# Patient Record
Sex: FEMALE | Race: ASIAN | NOT HISPANIC OR LATINO | Employment: FULL TIME | URBAN - METROPOLITAN AREA
[De-identification: names, ages, dates, MRNs, and addresses within clinical notes are randomized per-mention and may not be internally consistent; named-entity substitution may affect disease eponyms.]

---

## 2017-01-06 ENCOUNTER — OFFICE VISIT - HEALTHEAST (OUTPATIENT)
Dept: FAMILY MEDICINE | Facility: CLINIC | Age: 32
End: 2017-01-06

## 2017-01-06 DIAGNOSIS — Z00.00 ROUTINE GENERAL MEDICAL EXAMINATION AT A HEALTH CARE FACILITY: ICD-10-CM

## 2017-01-06 DIAGNOSIS — M54.50 LOWER BACK PAIN: ICD-10-CM

## 2017-01-06 ASSESSMENT — MIFFLIN-ST. JEOR: SCORE: 1430.05

## 2017-01-10 ENCOUNTER — AMBULATORY - HEALTHEAST (OUTPATIENT)
Dept: LAB | Facility: CLINIC | Age: 32
End: 2017-01-10

## 2017-01-10 DIAGNOSIS — Z00.00 ROUTINE GENERAL MEDICAL EXAMINATION AT A HEALTH CARE FACILITY: ICD-10-CM

## 2017-01-10 LAB
CHOLEST SERPL-MCNC: 125 MG/DL
FASTING STATUS PATIENT QL REPORTED: YES
HDLC SERPL-MCNC: 39 MG/DL
LDLC SERPL CALC-MCNC: 76 MG/DL
TRIGL SERPL-MCNC: 51 MG/DL

## 2017-01-12 LAB
BKR LAB AP ABNORMAL BLEEDING: NO
BKR LAB AP BIRTH CONTROL/HORMONES: NORMAL
BKR LAB AP CERVICAL APPEARANCE: NORMAL
BKR LAB AP GYN ADEQUACY: NORMAL
BKR LAB AP GYN INTERPRETATION: NORMAL
BKR LAB AP HPV REFLEX: NORMAL
BKR LAB AP LMP: NORMAL
BKR LAB AP PATIENT STATUS: NORMAL
BKR LAB AP PREVIOUS ABNORMAL: NO
BKR LAB AP PREVIOUS NORMAL: NO
HIGH RISK?: NO
PATH REPORT.COMMENTS IMP SPEC: NORMAL
RESULT FLAG (HE HISTORICAL CONVERSION): NORMAL

## 2017-11-03 ENCOUNTER — OFFICE VISIT - HEALTHEAST (OUTPATIENT)
Dept: FAMILY MEDICINE | Facility: CLINIC | Age: 32
End: 2017-11-03

## 2017-11-03 DIAGNOSIS — Z23 INFLUENZA VACCINE NEEDED: ICD-10-CM

## 2017-11-03 DIAGNOSIS — M54.50 CHRONIC MIDLINE LOW BACK PAIN WITHOUT SCIATICA: ICD-10-CM

## 2017-11-03 DIAGNOSIS — M54.2 BILATERAL POSTERIOR NECK PAIN: ICD-10-CM

## 2017-11-03 DIAGNOSIS — Z98.891 H/O: CESAREAN SECTION: ICD-10-CM

## 2017-11-03 DIAGNOSIS — G89.29 CHRONIC MIDLINE LOW BACK PAIN WITHOUT SCIATICA: ICD-10-CM

## 2017-11-03 DIAGNOSIS — R22.30: ICD-10-CM

## 2017-11-03 ASSESSMENT — MIFFLIN-ST. JEOR: SCORE: 1412.06

## 2017-12-08 ENCOUNTER — RECORDS - HEALTHEAST (OUTPATIENT)
Dept: ADMINISTRATIVE | Facility: OTHER | Age: 32
End: 2017-12-08

## 2018-03-14 ENCOUNTER — RECORDS - HEALTHEAST (OUTPATIENT)
Dept: ADMINISTRATIVE | Facility: OTHER | Age: 33
End: 2018-03-14

## 2019-02-19 ENCOUNTER — OFFICE VISIT - HEALTHEAST (OUTPATIENT)
Dept: FAMILY MEDICINE | Facility: CLINIC | Age: 34
End: 2019-02-19

## 2019-02-19 DIAGNOSIS — L29.3 ITCHY SKIN OF ANUS AND GENITALS: ICD-10-CM

## 2019-05-13 ENCOUNTER — OFFICE VISIT - HEALTHEAST (OUTPATIENT)
Dept: FAMILY MEDICINE | Facility: CLINIC | Age: 34
End: 2019-05-13

## 2019-05-13 DIAGNOSIS — E28.2 PCOS (POLYCYSTIC OVARIAN SYNDROME): ICD-10-CM

## 2019-05-13 DIAGNOSIS — L68.9 EXCESSIVE HAIR GROWTH: ICD-10-CM

## 2019-05-13 DIAGNOSIS — L29.9 ITCHY SKIN: ICD-10-CM

## 2019-05-13 DIAGNOSIS — M72.2 PLANTAR FASCIITIS: ICD-10-CM

## 2019-05-13 LAB
ALBUMIN SERPL-MCNC: 3.8 G/DL (ref 3.5–5)
ALP SERPL-CCNC: 78 U/L (ref 45–120)
ALT SERPL W P-5'-P-CCNC: 13 U/L (ref 0–45)
ANION GAP SERPL CALCULATED.3IONS-SCNC: 11 MMOL/L (ref 5–18)
AST SERPL W P-5'-P-CCNC: 11 U/L (ref 0–40)
BILIRUB SERPL-MCNC: 0.3 MG/DL (ref 0–1)
BUN SERPL-MCNC: 9 MG/DL (ref 8–22)
CALCIUM SERPL-MCNC: 9.7 MG/DL (ref 8.5–10.5)
CHLORIDE BLD-SCNC: 104 MMOL/L (ref 98–107)
CO2 SERPL-SCNC: 22 MMOL/L (ref 22–31)
CREAT SERPL-MCNC: 0.71 MG/DL (ref 0.6–1.1)
ERYTHROCYTE [DISTWIDTH] IN BLOOD BY AUTOMATED COUNT: 11.3 % (ref 11–14.5)
GFR SERPL CREATININE-BSD FRML MDRD: >60 ML/MIN/1.73M2
GLUCOSE BLD-MCNC: 80 MG/DL (ref 70–125)
HBA1C MFR BLD: 5.7 % (ref 3.5–6.1)
HCT VFR BLD AUTO: 37.8 % (ref 35–47)
HGB BLD-MCNC: 12.4 G/DL (ref 12–16)
MCH RBC QN AUTO: 28 PG (ref 27–34)
MCHC RBC AUTO-ENTMCNC: 32.7 G/DL (ref 32–36)
MCV RBC AUTO: 86 FL (ref 80–100)
PLATELET # BLD AUTO: 297 THOU/UL (ref 140–440)
PMV BLD AUTO: 7 FL (ref 7–10)
POTASSIUM BLD-SCNC: 4.2 MMOL/L (ref 3.5–5)
PROT SERPL-MCNC: 7.6 G/DL (ref 6–8)
RBC # BLD AUTO: 4.41 MILL/UL (ref 3.8–5.4)
SODIUM SERPL-SCNC: 137 MMOL/L (ref 136–145)
T4 FREE SERPL-MCNC: 0.9 NG/DL (ref 0.7–1.8)
TSH SERPL DL<=0.005 MIU/L-ACNC: 1.09 UIU/ML (ref 0.3–5)
WBC: 9.5 THOU/UL (ref 4–11)

## 2019-05-13 RX ORDER — FEXOFENADINE HCL 180 MG/1
180 TABLET ORAL DAILY
Status: SHIPPED | COMMUNITY
Start: 2019-05-13 | End: 2022-03-25

## 2019-05-14 LAB
25(OH)D3 SERPL-MCNC: 18.9 NG/ML (ref 30–80)
25(OH)D3 SERPL-MCNC: 18.9 NG/ML (ref 30–80)

## 2019-08-13 ENCOUNTER — APPOINTMENT (OUTPATIENT)
Age: 34
Setting detail: DERMATOLOGY
End: 2019-08-14

## 2019-08-13 VITALS — WEIGHT: 184 LBS | HEIGHT: 60 IN | RESPIRATION RATE: 14 BRPM

## 2019-08-13 DIAGNOSIS — L259 CONTACT DERMATITIS AND OTHER ECZEMA, UNSPECIFIED CAUSE: ICD-10-CM

## 2019-08-13 PROBLEM — L23.9 ALLERGIC CONTACT DERMATITIS, UNSPECIFIED CAUSE: Status: ACTIVE | Noted: 2019-08-13

## 2019-08-13 PROCEDURE — OTHER COUNSELING: OTHER

## 2019-08-13 PROCEDURE — OTHER PRESCRIPTION: OTHER

## 2019-08-13 PROCEDURE — 99202 OFFICE O/P NEW SF 15 MIN: CPT

## 2019-08-13 RX ORDER — TRIAMCINOLONE ACETONIDE 1 MG/G
0.1% CREAM TOPICAL TWICE DAILY
Qty: 1 | Refills: 0 | Status: ERX | COMMUNITY
Start: 2019-08-13

## 2019-08-13 ASSESSMENT — LOCATION DETAILED DESCRIPTION DERM
LOCATION DETAILED: RIGHT MEDIAL FRONTAL SCALP
LOCATION DETAILED: MONS PUBIS
LOCATION DETAILED: RIGHT RADIAL DORSAL HAND
LOCATION DETAILED: LEFT ULNAR DORSAL HAND
LOCATION DETAILED: LEFT LATERAL DORSAL FOOT
LOCATION DETAILED: RIGHT LATERAL DORSAL FOOT

## 2019-08-13 ASSESSMENT — LOCATION ZONE DERM
LOCATION ZONE: HAND
LOCATION ZONE: VULVA
LOCATION ZONE: SCALP
LOCATION ZONE: FEET

## 2019-08-13 ASSESSMENT — LOCATION SIMPLE DESCRIPTION DERM
LOCATION SIMPLE: RIGHT FOOT
LOCATION SIMPLE: RIGHT HAND
LOCATION SIMPLE: RIGHT SCALP
LOCATION SIMPLE: LEFT HAND
LOCATION SIMPLE: LEFT FOOT
LOCATION SIMPLE: GROIN

## 2019-08-13 NOTE — PROCEDURE: COUNSELING
Patient Specific Counseling (Will Not Stick From Patient To Patient): Informed her that it sounds like it is coming from a product that she is coming into contact with on her skin. Recommended that she only use products that are free from fragrances including her laundry soap. She states that hydrocortisone OTC has been helpful. Recommended that we treat with a prescribed topical steroid. Recommended Dove and Aveeno OTC for washing and moisturizing. Recommended that she alternate Selsun Blue with OTC Head and Shoulders for her scalp.\\nRecommended that she continue with the Allegra. She states that she notes pain in her feet or ankle pain with the Allegra. Informed her that it is likely not associated with her condition and likely coincidentally.\\nRecommended that she continue with the hydrocortisone OTC for the groin area.

## 2019-12-23 ENCOUNTER — OFFICE VISIT - HEALTHEAST (OUTPATIENT)
Dept: FAMILY MEDICINE | Facility: CLINIC | Age: 34
End: 2019-12-23

## 2019-12-23 DIAGNOSIS — Z00.00 ROUTINE GENERAL MEDICAL EXAMINATION AT A HEALTH CARE FACILITY: ICD-10-CM

## 2019-12-23 DIAGNOSIS — T78.40XS ALLERGIC STATE, SEQUELA: ICD-10-CM

## 2019-12-23 DIAGNOSIS — E55.9 VITAMIN D INSUFFICIENCY: ICD-10-CM

## 2019-12-23 LAB
CHOLEST SERPL-MCNC: 137 MG/DL
FASTING STATUS PATIENT QL REPORTED: YES
FASTING STATUS PATIENT QL REPORTED: YES
GLUCOSE BLD-MCNC: 91 MG/DL (ref 70–99)
HDLC SERPL-MCNC: 42 MG/DL
LDLC SERPL CALC-MCNC: 80 MG/DL
TRIGL SERPL-MCNC: 74 MG/DL

## 2019-12-23 ASSESSMENT — MIFFLIN-ST. JEOR: SCORE: 1420.29

## 2020-01-03 ENCOUNTER — OFFICE VISIT - HEALTHEAST (OUTPATIENT)
Dept: ALLERGY | Facility: CLINIC | Age: 35
End: 2020-01-03

## 2020-01-03 DIAGNOSIS — E55.9 VITAMIN D DEFICIENCY: ICD-10-CM

## 2020-01-03 DIAGNOSIS — L50.9 HIVES: ICD-10-CM

## 2020-01-03 LAB — EOSINOPHIL COUNT (ABS) - HISTORICAL: 122 /UL (ref 50–400)

## 2020-01-03 ASSESSMENT — MIFFLIN-ST. JEOR: SCORE: 1420.29

## 2020-01-05 LAB — 25(OH)D3 SERPL-MCNC: 17.5 NG/ML (ref 30–80)

## 2020-01-06 ENCOUNTER — AMBULATORY - HEALTHEAST (OUTPATIENT)
Dept: ALLERGY | Facility: CLINIC | Age: 35
End: 2020-01-06

## 2020-01-06 ENCOUNTER — COMMUNICATION - HEALTHEAST (OUTPATIENT)
Dept: ALLERGY | Facility: CLINIC | Age: 35
End: 2020-01-06

## 2020-01-06 DIAGNOSIS — E56.9 VITAMIN DEFICIENCY: ICD-10-CM

## 2020-10-12 ENCOUNTER — COMMUNICATION - HEALTHEAST (OUTPATIENT)
Dept: ALLERGY | Facility: CLINIC | Age: 35
End: 2020-10-12

## 2020-10-12 DIAGNOSIS — E56.9 VITAMIN DEFICIENCY: ICD-10-CM

## 2021-05-28 NOTE — PROGRESS NOTES
ASSESSMENT/PLAN:  Itchy skin  I advised that she continues with Allegra which she has found to be helpful.  She has switched to Aveeno and Dove for skin hydration and cleanser.  I advise using personal care products with safe for ingredients that is also hypoallergenic; consider beautycounter products.  She will follow-up with me as needed  -     HM2(CBC w/o Differential)  -     Comprehensive Metabolic Panel    Plantar fasciitis, bilateral  Patient instructions printed.  Stretching exercises provided.  I advised supportive shoes.  Follow-up as needed    Excessive facial hair growth  Hair loss of her head  Consider PCOS.  Will start metformin  Follow-up in 1 month  -     Thyroid Stimulating Hormone (TSH)  -     T4, Free  -     Glycosylated Hemoglobin A1c  -     Vitamin D, Total (25-Hydroxy)  -     metFORMIN (GLUCOPHAGE XR) 500 MG 24 hr tablet; Take 4 tablets (2,000 mg total) by mouth daily with breakfast.    Patient Instructions   Take metformin 1 tablet daily for one week, then 2 tabs daily for the second week, 3 tabs daily for the 3rd week, and 4 tab daily thereafter    Follow up in one month      SUBJECTIVE:    Katerina Moran is a 33 y.o. female who came in today     Itchy skin  The patient comes in today for follow-up.  She complains of itchiness especially of the interdigit of both hands and feet.  The itchiness is worse at night.  She has tried Allegra which does help.  She also complains occasionally itchiness of the anus and genital area as well.  She has switched to Aveeno lotion and Dove soap.  There is no one at home who complains of itchiness.    Plantar fasciitis, bilateral  The patient complains of bilateral heel pain that is worse in the morning.  It is worse upon the first few steps.  It does get somewhat better as the day progresses.  It is also better when she does not bear weight.  This is been occurring for about a month    Excessive facial hair growth  The patient has had excessive hair growth  and moves noticeable for about a month now.    Hair loss  The patient has had progressive hair loss over the last 2 to 3 years now worse over the last month.  He was seen by her gynecologist many years ago and told of PCOS diagnosis.  She was never started on medication.  She is not on birth control pills.    Review of Systems (except those mentioned above)  Constitutional: Negative.   HENT: Negative.   Eyes: Negative.   Respiratory: Negative.   Cardiovascular: Negative.   Gastrointestinal: Negative.   Endocrine: Negative.   Genitourinary: Negative.   Musculoskeletal: Negative.   Skin: Negative.   Allergic/Immunologic: Negative.   Neurological: Negative.   Hematological: Negative.   Psychiatric/Behavioral: Negative.     There are no active problems to display for this patient.    No Known Allergies  Current Outpatient Medications   Medication Sig Dispense Refill     fexofenadine (ALLEGRA) 180 MG tablet Take 180 mg by mouth daily.       hydrocortisone 1 % cream Apply to affected areas twice daily 30 g 0     metFORMIN (GLUCOPHAGE XR) 500 MG 24 hr tablet Take 4 tablets (2,000 mg total) by mouth daily with breakfast. 120 tablet 0     No current facility-administered medications for this visit.      No past medical history on file.  Past Surgical History:   Procedure Laterality Date      SECTION, LOW TRANSVERSE       Social History     Socioeconomic History     Marital status:      Spouse name: None     Number of children: None     Years of education: None     Highest education level: None   Occupational History     None   Social Needs     Financial resource strain: None     Food insecurity:     Worry: None     Inability: None     Transportation needs:     Medical: None     Non-medical: None   Tobacco Use     Smoking status: Never Smoker     Smokeless tobacco: Never Used   Substance and Sexual Activity     Alcohol use: No     Drug use: No     Sexual activity: Yes     Partners: Male     Birth  control/protection: None   Lifestyle     Physical activity:     Days per week: None     Minutes per session: None     Stress: None   Relationships     Social connections:     Talks on phone: None     Gets together: None     Attends [employer] service: None     Active member of club or organization: None     Attends meetings of clubs or organizations: None     Relationship status: None     Intimate partner violence:     Fear of current or ex partner: None     Emotionally abused: None     Physically abused: None     Forced sexual activity: None   Other Topics Concern     None   Social History Narrative     None     Family History   Problem Relation Age of Onset     Hyperlipidemia Mother      Diabetes Father      Hypertension Father      Diabetes Paternal Grandmother      Diabetes Paternal Grandfather          OBJECTIVE:    Vitals:    05/13/19 1329   BP: 100/60   Patient Site: Left Arm   Patient Position: Sitting   Cuff Size: Adult Regular   Pulse: 76   Weight: 184 lb 6 oz (83.6 kg)     Body mass index is 36.53 kg/m .    Physical Exam:  Constitutional: Patient was oriented to person, place, and time. Patient appeared well-developed and well-nourished. No distress.   Head: Normocephalic and atraumatic.   Right Ear: External ear normal. Normal TM  Left Ear: External ear normal. Normal TM  Nose: Nose normal.   Mouth/Throat: Oropharynx was clear and moist. No oropharyngeal exudate.   Eyes: Conjunctivae and EOM were normal. Pupils were equal, round, and reactive to light. Right eye exhibited no discharge. Left eye exhibited no discharge. No scleral icterus.   Lymphadenopathy:  Patient has no cervical adenopathy.   Skin: Skin was warm and dry. No rash noted. Patient was not diaphoretic. No erythema. No pallor.       Results for orders placed or performed in visit on 05/13/19   Glycosylated Hemoglobin A1c   Result Value Ref Range    Hemoglobin A1c 5.7 3.5 - 6.1 %   HM2(CBC w/o Differential)   Result Value Ref Range    WBC 9.5  4.0 - 11.0 thou/uL    RBC 4.41 3.80 - 5.40 mill/uL    Hemoglobin 12.4 12.0 - 16.0 g/dL    Hematocrit 37.8 35.0 - 47.0 %    MCV 86 80 - 100 fL    MCH 28.0 27.0 - 34.0 pg    MCHC 32.7 32.0 - 36.0 g/dL    RDW 11.3 11.0 - 14.5 %    Platelets 297 140 - 440 thou/uL    MPV 7.0 7.0 - 10.0 fL

## 2021-05-28 NOTE — PATIENT INSTRUCTIONS - HE
Take metformin 1 tablet daily for one week, then 2 tabs daily for the second week, 3 tabs daily for the 3rd week, and 4 tab daily thereafter    Follow up in one month   Statement Selected

## 2021-05-30 VITALS — HEIGHT: 60 IN | BODY MASS INDEX: 35.12 KG/M2 | WEIGHT: 178.9 LBS

## 2021-05-31 VITALS — WEIGHT: 174.7 LBS | BODY MASS INDEX: 34.3 KG/M2 | HEIGHT: 60 IN

## 2021-06-02 VITALS — BODY MASS INDEX: 35.67 KG/M2 | WEIGHT: 180 LBS

## 2021-06-02 VITALS — BODY MASS INDEX: 36.53 KG/M2 | WEIGHT: 184.38 LBS

## 2021-06-03 VITALS
OXYGEN SATURATION: 99 % | HEART RATE: 76 BPM | WEIGHT: 175 LBS | HEIGHT: 60 IN | DIASTOLIC BLOOD PRESSURE: 58 MMHG | BODY MASS INDEX: 34.36 KG/M2 | SYSTOLIC BLOOD PRESSURE: 98 MMHG

## 2021-06-03 VITALS
SYSTOLIC BLOOD PRESSURE: 94 MMHG | HEART RATE: 88 BPM | HEIGHT: 60 IN | DIASTOLIC BLOOD PRESSURE: 60 MMHG | WEIGHT: 175 LBS | BODY MASS INDEX: 34.36 KG/M2

## 2021-06-04 NOTE — PATIENT INSTRUCTIONS - HE
Chronic autoimmune hives (dermatographia)    Check bloodwork    Fexofenadine 180 mg twice daily plus Loratidine (Claritin) 10 mg twice daily     Try for 1-2 weeks    If not improving, call    If improving, stop 2-4 week cycles    85% of patients do resolve in 1-2 years

## 2021-06-04 NOTE — PROGRESS NOTES
Chief complaint: Itching    History of present illness: This is a pleasant 34-year-old woman who is here today for itching.  She states for the last year she has had itching on the backs of her hands and between her fingers.  She also has itching in her scalp on her feet and her vaginal area.  She states the itching worsens at night.  If she itches hard enough on her hands she will note the development of hives.  She rings pictures today that show hives on the dorsal surfaces of her hands.  She states she seen dermatology and there was no etiology found for her itching.  She has been taking Allegra 180 mg.  She has used this twice daily which helps.  If she stops taking the Allegra, however, her symptoms return.  No swelling of her lips or eyes.  No bruising.  No belly pain.  No joint pain.  She does not take anything over-the-counter such as nonsteroidal anti-inflammatory drugs or other supplements.  She has never had itching like this before.  No family history of itching.  Nobody in the family has similar itching.  She reports that hydrocortisone cream seem to help in the vaginal area she has not tried this on other areas of her skin.    Past medical history: Otherwise unremarkable    Social history: She lives in an apartment, no recent changes at home, non-smoking    Family history: Negative for urticaria    Review of Systems performed as above and the remainder is negative.       Current Outpatient Medications:      fexofenadine (ALLEGRA) 180 MG tablet, Take 180 mg by mouth daily., Disp: , Rfl:     No Known Allergies    BP 94/60   Pulse 88   Ht 5' (1.524 m)   Wt 175 lb (79.4 kg)   LMP 12/15/2019   BMI 34.18 kg/m    Gen: Pleasant female not in acute distress  HEENT: Eyes no erythema of the bulbar or palpebral conjunctiva, no edema.  Mouth: Throat clear, no lip or tongue edema.     Skin: Dermatographia over the dorsal surfaces of hands  Psych: Alert and oriented times 3    Impression report and plan:  1.   Chronic urticaria    I think this is chronic urticaria, however, somewhat unusual to have the symptoms in her vaginal area.  I do not see any evidence of scabies even though she does have symptoms in the webs of her fingers.  She has been seen by dermatology who felt that she did not have the symptoms.  No other family members have the symptoms either.  If the Allegra helps I recommend increasing this to twice daily.  She can take up to 2 tablets twice daily.  If this is not improving symptoms, she will let me know.  I went over specific triggers for chronic urticaria.  Stated that allergy testing is not necessary for chronic autoimmune urticaria.  85% of patients with chronic autoimmune urticaria do resolve within 1 to 2 years.  She will notify me if symptoms are not well controlled.  We talked about doing 2 to 4 weeks cycles.  I would like to recheck her vitamin D level as this is been low in the past and low vitamin D has been associated with poorly controlled hives.  I would also like to check an eosinophil count.  TSH, AST, ALT and creatinine were previously checked and normal.  Follow as needed.    Time spent with the patient, 30 minutes, greater than half spent counseling and coordination of care regarding chronic urticaria.

## 2021-06-08 NOTE — PROGRESS NOTES
Assessment/Plan:        Diagnoses and all orders for this visit:    Routine general medical examination at a health care facility  -     Gynecologic Cytology (PAP Smear)  -     HM2(CBC w/o Differential); Future; Expected date: 1/7/17  -     Glucose; Future; Expected date: 1/7/17  -     Lipid Cascade; Future; Expected date: 1/7/17    Lower back pain  -     Ambulatory referral to PT/OT    Lump    Other orders  -     Influenza, Seasonal,Quad Inj, 36+ MOS        She is not fasting.  Will send for future lab orders.  Pap smear was also done.  Improve food choices, increase physical activity as tolerated.  For her lower back pain, likely mechanical.  Consider physical therapy.  Recheck if persistent or worsen 4-6 weeks.  For the lump in her right arm, likely benign dermatofibroma.  Closely monitor for changes.  If worse in a short of time, recheck.  Encouraged annual physical.  She was agreeable with the plans.  Subjective:    Patient ID: Katerina Moran is a 31 y.o. female.    \[facility] [place] [facility]\""    Katerina is here for her physical.  She found a lump on her right medial arm around 4 months ago.  Her son usually sleeps in her arm and noticed some discomfort.  She then checked the area and found the lump.  It has been the same since.  No skin discoloration, trauma, injury.  No discharge, bleeding.  She also recalls of having lumps in other parts of her body but cannot find them at this time.    Has been dealing with lower back pain for around 5 years.  It is intermittent.  Worse when she bends forward and unable to tolerate it for more than a minute.  She feels better if she stands or changes position.  Seems to be noted after her delivery.  Had epidural at that time and wondered if it was related to it.  No evaluation before.  Pain is localized in her lower back, achy, 8-9/10 at its worst.  No history of surgery to her back or trauma.  No numbness or weakness in her legs.  No incontinence.  Is not taking any medications for it.    Last  "Pap smear we have on file was from 2013. She cannot remember if she had one after that.  No history of abnormal Pap smear before.    Review of Systems  As above otherwise negative.    Past medical history: As above.    Past surgical history: As above.   ×2.  First one was from oligohydramnios.  Second was a repeat.    Family history: Father with diabetes, hypertension.  Mom with dyslipidemia.  Paternal grandparents with diabetes.    Social history: Denies any issues with smoking, alcohol.  She tries to walk 3-5 days a week, 30-50 minutes.  Tries to eat healthy but likes her rice.        Objective:    Physical Exam  Visit Vitals     /60 (Patient Site: Left Arm, Patient Position: Sitting, Cuff Size: Adult Large)     Pulse 71     Ht 4' 11.5\" (1.511 m)     Wt 178 lb 14.4 oz (81.1 kg)     LMP 2016 (Exact Date)     SpO2 100%     Breastfeeding No     BMI 35.53 kg/m2       Vital signs noted above. AAO ×3.  HEENT negative.  Neck: Supple neck, nonpalpable cervical lymph nodes. No thyromegaly. Lungs: Clear to auscultation bilateral.  Heart: S1-S2 regular rate and rhythm, no murmurs were noted.  Abdomen: Flabby, soft with bowel sounds and nontender.  Extremities: No edema, pulses were full and equal.  Negative straight leg raising.  Motor 5/5 in her lower extremities.  Breast exam: No nipple bleeding or discharge, no mass or tenderness, no axillary lymphadenopathy.  Pelvic exam: Negative CMT, no adnexal mass or tenderness.  Skin: Lump on her right medial arm, 1 x 1.5 cm, slightly firm, superficial, nontender.  No skin discoloration.          "

## 2021-06-12 NOTE — TELEPHONE ENCOUNTER
Called and discussed. I offered to make a lab only appt but she has her annual physical coming up and will have the level checked then.

## 2021-06-13 NOTE — PROGRESS NOTES
Assessment:   Katerina Moran is a 32 y.o. female is here with   Chief Complaint   Patient presents with     Neck Pain     whole neck, x1 + month, no known injury, worse in the morning     Back Pain     x4/5 years, mid-lower back, worse when bending     Derm Problem     lumps on arms x2 years, sore to touch     Problem List Items Addressed This Visit     H/O:  section    Chronic midline low back pain without sciatica - Primary    Relevant Orders    Ambulatory referral to Physical Therapy    Bilateral posterior neck pain    Relevant Orders    Ambulatory referral to Physical Therapy    Skin lump of arm    BMI 34.0-34.9,adult      Other Visit Diagnoses     Influenza vaccine needed        Relevant Orders    Influenza, Seasonal,Quad Inj, 36+ MOS (Completed)            Plan:      Natural history and expected course discussed. Questions answered.  Educational material distributed.  Proper lifting, bending technique discussed.  Stretching exercises discussed.  Regular aerobic and trunk strengthening exercises discussed.  Short (2-4 day) period of relative rest recommended until acute symptoms improve.  Ice to affected area as needed for local pain relief.  Heat to affected area as needed for local pain relief.  NSAIDs per medication orders.  PT referral.     Subjective:      Katerina Moran is a 32 y.o. female who presents for evaluation of low back pain.   The patient has had recurrent self limited episodes of low back pain in the past.   Symptoms have been present for 8 years  and are gradually worsening.    Onset was related to / precipitated by no known injury and h/o 2 C sections . The pain is located in the across the lower back legs and does not radiate.  The pain is described as sharp, soreness and stiffness and occurs intermittently and in the morning. She rates her pain as moderate.   Symptoms are exacerbated by twisting and bending forward . Symptoms are improved by stretching and but does not use any  "med .   She has also tried nothing which provided no symptom relief. She has no other symptoms associated with the back pain. The patient has no \"red flag\" history indicative of complicated back pain.    Neck Pain: Cameron complains of neck pain. Event that precipitate these symptoms: more stress from his 8 yr old . Onset of symptoms 3 months ago, unchanged since that time. Current symptoms are numbness in rt hand , pain in back of the neck and upper back (aching and numbing in character; 7/10 in severity) and stiffness in more in the morning . Patient denies weakness in arm . Patient has had no prior neck problems.  Previous treatments include: none.  The following portions of the patient's history were reviewed and updated as appropriate: allergies, current medications, past family history, past medical history, past social history, past surgical history and problem list.    Review of Systems  A 12 point comprehensive review of systems was negative except as noted.      Objective:    Full range of motion without pain, no tenderness, no spasm, no curvature.  Normal reflexes, gait, strength and negative straight-leg raise.  Inspection and palpation: inspection of back is normal.  Muscle tone and ROM exam: muscle tone normal without spasm, full range of motion without pain neck shoulder area , limited range of motion with pain mid back .  Straight leg raise: negativebilaterally.  Neurological: normal DTRs, muscle strength and reflexes.  Arm : tiny pea size Muscle knots both arms no skin changes   A total of 40 minutes visit with over 50% of the time spent on counseling the patient and in coordinating care       There are other unrelated non-urgent complaints, but due to the busy schedule and the amount of time I've already spent with her, time does not permit me to address these routine issues at today's visit. I've requested another appointment to review these additional issues.    Cate Escobar"

## 2021-06-24 NOTE — PROGRESS NOTES
ASSESSMENT/PLAN:  Itchy skin of anus and genitals  Hydrocortisone two times a day in addition to vaseline throughout the day to the inguinal/vaginal/rectal area  Avoid scratch-itch cycle  -     hydrocortisone 1 % cream; Apply to affected areas twice daily    Itchy skin along her  scar  May apply hydrocortisone to the area two times a day  Keep area clean and dry  Lose weight. See plan below    BMI 35.0-35.9,adult  Counseled healthy lifestyle modifications  She will start a dietary journal  Advised 30 minutes exercise daily  Come back in 1 month    SUBJECTIVE:    Katerina Moran is a 33 y.o. female who came in today complaining of external vaginal and abdominal itchiness (labia and  scar) that began 1 month ago. Patient has tried maximum strength Vagisil, ice, and diaper rash cream with no symptom improvement. Denies abnormal vaginal discharge, abnormal vaginal bleeding, rash, bumps,  or use of new products. Itchiness is worst at night.     The patient stated that for the last couple years now she has tried to lose weight and has had a difficult time doing so.  She has eliminated rice from her diet.  She states she eats very small portion.  She does not engage in any exercise regimen.  Nonetheless, she is frustrated at her inability to lose the weight    Review of Systems (except those mentioned above)  Constitutional: Negative.   HENT: Negative.   Eyes: Negative.   Respiratory: Negative.   Cardiovascular: Negative.   Gastrointestinal: Negative.   Endocrine: Negative.   Genitourinary: Negative.   Musculoskeletal: Negative.   Skin: Negative.   Allergic/Immunologic: Negative.   Neurological: Negative.   Hematological: Negative.   Psychiatric/Behavioral: Negative.     There are no active problems to display for this patient.    No Known Allergies  Current Outpatient Medications   Medication Sig Dispense Refill     hydrocortisone 1 % cream Apply to affected areas twice daily 30 g 0     No current  facility-administered medications for this visit.      No past medical history on file.  Past Surgical History:   Procedure Laterality Date      SECTION, LOW TRANSVERSE  2010     Social History     Socioeconomic History     Marital status:      Spouse name: None     Number of children: None     Years of education: None     Highest education level: None   Social Needs     Financial resource strain: None     Food insecurity - worry: None     Food insecurity - inability: None     Transportation needs - medical: None     Transportation needs - non-medical: None   Occupational History     None   Tobacco Use     Smoking status: Never Smoker     Smokeless tobacco: Never Used   Substance and Sexual Activity     Alcohol use: No     Drug use: No     Sexual activity: Yes     Partners: Male     Birth control/protection: None   Other Topics Concern     None   Social History Narrative     None     Family History   Problem Relation Age of Onset     Hyperlipidemia Mother      Diabetes Father      Hypertension Father      Diabetes Paternal Grandmother      Diabetes Paternal Grandfather      OBJECTIVE:    Vitals:    19 1543   BP: 100/68   Pulse: 76   Weight: 180 lb (81.6 kg)     Body mass index is 35.67 kg/m .    Physical Exam:   Skin: Skin was warm and dry. No rash noted. Patient was not diaphoretic. No erythema. No pallor. Hyperpigmentation along the  scar with a few erythematous spots.   Pelvic exam: normal external genitalia, vulva, vagina, cervix, uterus and adnexa. Most itchy along the inguinal bilaterally and the labia majora. Hyperpigmentation and excoriation.     Results for orders placed or performed in visit on 01/10/17   HM2(CBC w/o Differential)   Result Value Ref Range    WBC 6.7 4.0 - 11.0 thou/uL    RBC 4.23 3.80 - 5.40 mill/uL    Hemoglobin 11.9 (L) 12.0 - 16.0 g/dL    Hematocrit 36.0 35.0 - 47.0 %    MCV 85 80 - 100 fL    MCH 28.0 27.0 - 34.0 pg    MCHC 33.0 32.0 - 36.0 g/dL    RDW 12.3  11.0 - 14.5 %    Platelets 245 140 - 440 thou/uL    MPV 7.4 7.0 - 10.0 fL   Glucose   Result Value Ref Range    Glucose 85 70 - 125 mg/dL    Patient Fasting > 8hrs? Yes    Lipid Cascade   Result Value Ref Range    Cholesterol 125 <=199 mg/dL    Triglycerides 51 <=149 mg/dL    HDL Cholesterol 39 (L) >=50 mg/dL    LDL Calculated 76 <=129 mg/dL    Patient Fasting > 8hrs? Yes      ADDITIONAL HISTORY SUMMARIZED (2): None.   DECISION TO OBTAIN EXTRA INFORMATION (1): None.   RADIOLOGY TESTS (1): None.  LABS (1): None.  MEDICINE TESTS (1): None.  INDEPENDENT REVIEW (2 each): None.     Total data points = 0    A total of 25 minutes visit with over 50% of the time spent on counseling the patient healthy lifestyle modifications and exercise.   By signing my name below, I, Anjelica Asher, attest that this documentation has been prepared under the direction and in the presence of Dr. Ramandeep Altamirano.  Electronic Signature: Kwbaena Judd. 02/19/2019 15:52.    I, Dr. Betsy Duarte MD , personally performed the services described in this documentation. All medical record entries made by the scribe were at my direction and in my presence. I have reviewed the chart and discharge instructions (if applicable) and agree that the record reflects my personal performance and is accurate and complete.

## 2021-06-26 ENCOUNTER — HEALTH MAINTENANCE LETTER (OUTPATIENT)
Age: 36
End: 2021-06-26

## 2021-06-28 NOTE — PROGRESS NOTES
"Progress Notes by Betsy Bui MD at 12/23/2019  8:20 AM     Author: Betsy Bui MD Service: -- Author Type: Physician    Filed: 12/23/2019  9:06 AM Encounter Date: 12/23/2019 Status: Signed    : Betsy Bui MD (Physician)       FEMALE PREVENTATIVE EXAM    Assessment and Plan:     Routine general medical examination at a health care facility  -     Lipid Cascade  -     Glucose  We discussed healthy lifestyle, nutrition, cardiovascular risk reduction, self care, safety, sunscreen, seatbelt, and timing of cancer screening.  Health maintenance screening and immunizations reviewed with the patient.    Allergic state, sequela  -     Ambulatory referral to Allergy  No relief with OTC     BMI 34.0-34.9,adult  Counseled healthy lifestyle modifications  Lost about 10lbs over the last few months with healthy lifestyle modifications     Vitamin D insufficiency  Advised daily vitamin D    Next follow up:  Return in about 1 year (around 12/23/2020).    Immunization Review  Adult Imm Review: No immunizations due today  BMI: 34.18    I discussed the following with the patient:   Adult Healthy Living: Importance of regular exercise  Healthy nutrition  Getting adequate sleep  Stress management  Supplement use    Subjective:   Chief Complaint: Katerina Moran is an 34 y.o. female here for a preventative health visit.     HPI:    Weight Management: She has lost about 10 pounds since her last visit in May. She has joined a weight loss program to aid her. She is very happy with her success thus far. She does not have any questions or concerns about weight loss at this time.     Skin Irritation: She has a history of allergies. Her fingers and  vaginal area will be extremely itchy. She has been taking allegra 180 mg once a day for about 7-8 months. She has been \"suffering a lot.\" She has not had any improvement in her symptoms. Her symptoms are worsened if she misses a day of " her allegra. She has tried changing her lotions, detergents and diet without any relief.      Health Maintenance: She is fasting this morning. She had a normal pap smear in 2017. She has not been taking a vitamin D supplement because she was told that she would gain weight if she took one. Her last tetanus vaccination was in 2014.      Review of Systems: Please see above. The rest of the review of systems are negative for all systems.     PSFH:  She has two sons who get along well. Her oldest is 10 years old and the youngest is 4.5 years old.     Healthy Habits  Are you taking a daily aspirin? No  Do you typically exercising at least 40 min, 3-4 times per week?  Yes- walking more   Do you usually eat at least 4 servings of fruit and vegetables a day, include whole grains and fiber and avoid regularly eating high fat foods? Yes  Have you had an eye exam in the past two years? Yes  Do you see a dentist twice per year? NO  Do you have any concerns regarding sleep? No    Safety Screen  If you own firearms, are they secured in a locked gun cabinet or with trigger locks? Yes  Do you feel you are safe where you are living?: Yes (12/23/2019  8:20 AM)  Do you feel you are safe in your relationship(s)?: Yes (12/23/2019  8:20 AM)      Pap History:   No - age 30-65 PAP every 3 years recommended  Cancer Screening       Status Date      PAP SMEAR Next Due 1/6/2022      Done 1/6/2017 GYNECOLOGIC CYTOLOGY (PAP SMEAR)     Patient has more history with this topic...        Patient Care Team:  Betsy Bui MD as PCP - General  Betsy Bui MD as Assigned PCP    History     Reviewed By Date/Time Sections Reviewed    Faye Molina CMA 12/23/2019  8:20 AM Tobacco        Objective:   Vital Signs:   Visit Vitals  BP 98/58   Pulse 76   Ht 5' (1.524 m)   Wt 175 lb (79.4 kg)   LMP 12/15/2019   SpO2 99%   BMI 34.18 kg/m       PHYSICAL EXAM  Constitutional: Patient is oriented to person, place, and time.  Patient appears well-developed and well-nourished. No distress.   Head: Normocephalic and atraumatic.   Right Ear: External ear normal.   Left Ear: External ear normal.   Nose: Nose normal.   Mouth/Throat: Oropharynx is clear and moist. No oropharyngeal exudate.   Eyes: Conjunctivae and EOM are normal. Pupils are equal, round, and reactive to light. Right eye exhibits no discharge. Left eye exhibits no discharge. No scleral icterus.   Neck: Neck supple. No JVD present. No tracheal deviation present. No thyromegaly present.   Breasts:  Normal appearing, no skin involvement, no palpable mass, no tenderness on palpation.  No axillary involvement  Cardiovascular: Normal rate, regular rhythm, normal heart sounds and intact distal pulses.   No murmur heard.   Pulmonary/Chest: Effort normal and breath sounds normal. No stridor. No respiratory distress. Patient has no wheezes, no rales, exhibits no tenderness.   Abdominal: Soft. Bowel sounds are normal. Patient exhibits no distension and no mass. There is no tenderness. There is no rebound and no guarding.   Lymphadenopathy:  Patient has no cervical adenopathy.   Neurological: Patient is alert and oriented to person, place, and time. Patient has normal reflexes. No cranial nerve deficit. Coordination normal.   Skin: Skin is warm and dry. No rash noted. Patient is not diaphoretic. No erythema. No pallor. She has a furuncle on her thigh.  Psychiatric: Patient has good eye contact without any psychomotor retardation or stereotypic behaviors.  normal mood and affect. Judgment and thought content normal.   Speech is regular rate and rhythm.     ADDITIONAL HISTORY SUMMARIZED (2): None.  DECISION TO OBTAIN EXTRA INFORMATION (1): None.   RADIOLOGY TESTS (1): None.  LABS (1): Reviewed labs from 01/06/17 and ordered labs today.   MEDICINE TESTS (1): None.  INDEPENDENT REVIEW (2 each): None.     The visit lasted a total of 9 minutes face to face with the patient. Over 50% of the time  was spent counseling and educating the patient about weight management, skin irritation and overall wellness .    I, Dinora Moore, am scribing for and in the presence of, Dr. Altamirano.    I, Dr. Altamirano, personally performed the services described in this documentation, as scribed by Dinora Moore in my presence, and it is both accurate and complete.    Total data points: 1         Medication List          Accurate as of December 23, 2019  9:02 AM. If you have any questions, ask your nurse or doctor.            CONTINUE taking these medications    fexofenadine 180 MG tablet  Also known as:  ALLEGRA  INSTRUCTIONS:  Take 180 mg by mouth daily.           STOP taking these medications    hydrocortisone 1 % cream  Stopped by:  Betsy Duarte MD     metFORMIN 500 MG 24 hr tablet  Also known as:  Glucophage XR  Stopped by:  Betsy Duarte MD     triamcinolone 0.1 % cream  Also known as:  KENALOG  Stopped by:  Betsy Duarte MD            Additional Screenings Completed Today:

## 2021-10-16 ENCOUNTER — HEALTH MAINTENANCE LETTER (OUTPATIENT)
Age: 36
End: 2021-10-16

## 2022-02-02 ENCOUNTER — IMMUNIZATION (OUTPATIENT)
Dept: NURSING | Facility: CLINIC | Age: 37
End: 2022-02-02
Payer: COMMERCIAL

## 2022-02-02 ENCOUNTER — IMMUNIZATION (OUTPATIENT)
Dept: FAMILY MEDICINE | Facility: CLINIC | Age: 37
End: 2022-02-02
Payer: COMMERCIAL

## 2022-02-02 PROCEDURE — 90471 IMMUNIZATION ADMIN: CPT

## 2022-02-02 PROCEDURE — 91305 COVID-19,PF,PFIZER (12+ YRS): CPT

## 2022-02-02 PROCEDURE — 0054A COVID-19,PF,PFIZER (12+ YRS): CPT

## 2022-02-02 PROCEDURE — 90686 IIV4 VACC NO PRSV 0.5 ML IM: CPT

## 2022-03-25 ENCOUNTER — OFFICE VISIT (OUTPATIENT)
Dept: FAMILY MEDICINE | Facility: CLINIC | Age: 37
End: 2022-03-25
Payer: COMMERCIAL

## 2022-03-25 VITALS
OXYGEN SATURATION: 99 % | RESPIRATION RATE: 12 BRPM | WEIGHT: 183 LBS | HEART RATE: 74 BPM | SYSTOLIC BLOOD PRESSURE: 110 MMHG | TEMPERATURE: 98 F | BODY MASS INDEX: 34.55 KG/M2 | HEIGHT: 61 IN | DIASTOLIC BLOOD PRESSURE: 62 MMHG

## 2022-03-25 DIAGNOSIS — Z00.00 ROUTINE GENERAL MEDICAL EXAMINATION AT A HEALTH CARE FACILITY: Primary | ICD-10-CM

## 2022-03-25 DIAGNOSIS — G89.29 CHRONIC NONINTRACTABLE HEADACHE, UNSPECIFIED HEADACHE TYPE: ICD-10-CM

## 2022-03-25 DIAGNOSIS — F51.01 PRIMARY INSOMNIA: ICD-10-CM

## 2022-03-25 DIAGNOSIS — R51.9 CHRONIC NONINTRACTABLE HEADACHE, UNSPECIFIED HEADACHE TYPE: ICD-10-CM

## 2022-03-25 LAB
CHOLEST SERPL-MCNC: 154 MG/DL
ERYTHROCYTE [DISTWIDTH] IN BLOOD BY AUTOMATED COUNT: 12.4 % (ref 10–15)
FASTING STATUS PATIENT QL REPORTED: YES
HCT VFR BLD AUTO: 38.8 % (ref 35–47)
HDLC SERPL-MCNC: 49 MG/DL
HGB BLD-MCNC: 12.5 G/DL (ref 11.7–15.7)
LDLC SERPL CALC-MCNC: 95 MG/DL
MCH RBC QN AUTO: 29 PG (ref 26.5–33)
MCHC RBC AUTO-ENTMCNC: 32.2 G/DL (ref 31.5–36.5)
MCV RBC AUTO: 90 FL (ref 78–100)
PLATELET # BLD AUTO: 269 10E3/UL (ref 150–450)
RBC # BLD AUTO: 4.31 10E6/UL (ref 3.8–5.2)
TRIGL SERPL-MCNC: 51 MG/DL
WBC # BLD AUTO: 5.8 10E3/UL (ref 4–11)

## 2022-03-25 PROCEDURE — 36415 COLL VENOUS BLD VENIPUNCTURE: CPT | Performed by: FAMILY MEDICINE

## 2022-03-25 PROCEDURE — 85027 COMPLETE CBC AUTOMATED: CPT | Performed by: FAMILY MEDICINE

## 2022-03-25 PROCEDURE — 99214 OFFICE O/P EST MOD 30 MIN: CPT | Mod: 25 | Performed by: FAMILY MEDICINE

## 2022-03-25 PROCEDURE — 90471 IMMUNIZATION ADMIN: CPT | Performed by: FAMILY MEDICINE

## 2022-03-25 PROCEDURE — 80061 LIPID PANEL: CPT | Performed by: FAMILY MEDICINE

## 2022-03-25 PROCEDURE — 99395 PREV VISIT EST AGE 18-39: CPT | Mod: 25 | Performed by: FAMILY MEDICINE

## 2022-03-25 PROCEDURE — 90715 TDAP VACCINE 7 YRS/> IM: CPT | Performed by: FAMILY MEDICINE

## 2022-03-25 RX ORDER — CLOTRIMAZOLE AND BETAMETHASONE DIPROPIONATE 10; .64 MG/G; MG/G
CREAM TOPICAL
COMMUNITY
Start: 2020-09-03 | End: 2023-12-12

## 2022-03-25 RX ORDER — NYSTATIN 100000 U/G
CREAM TOPICAL
COMMUNITY
Start: 2020-09-30 | End: 2023-12-12

## 2022-03-25 NOTE — PROGRESS NOTES
SUBJECTIVE:   CC: Katerina Moran is an 36 year old woman who presents for preventive health visit.     Patient has been advised of split billing requirements and indicates understanding: Yes  Healthy Habits:     Getting at least 3 servings of Calcium per day:  Yes    Bi-annual eye exam:  Yes    Dental care twice a year:  Yes    Sleep apnea or symptoms of sleep apnea:  None    Diet:  Regular (no restrictions)    Frequency of exercise:  2-3 days/week    Duration of exercise:  30-45 minutes    Taking medications regularly:  Yes    Medication side effects:  None    PHQ-2 Total Score: 2    Additional concerns today:  No    1) headache on back of head to the neck for 2 years. Almost daily and started at afternoon or evening. Mild to moderate. she takes motrin that helps. Occasionally she has nausea but no vomiting. No head injury, vision change and fever.   2) insomnia: she is ok to fall asleep but wakes up at around 1-2 am,    then she watches cell phone and after she is not able to go back to sleep. No medication.   3) she feels alone and little depressed due to not working. She worked one year before covid pandemic. She has two children. she is trying to find a job now. Denies smoke, alcohol and drugs            Today's PHQ-2 Score:   PHQ-2 ( 1999 Pf[employer]) 3/25/2022   Q1: Little interest or pleasure in doing things 2   Q2: Feeling down, depressed or hopeless 0   PHQ-2 Score 2   Q1: Little interest or pleasure in doing things More than half the days   Q2: Feeling down, depressed or hopeless Not at all   PHQ-2 Score 2       Abuse: Current or Past (Physical, Sexual or Emotional) - No  Do you feel safe in your environment? Yes    Have you ever done Advance Care Planning? (For example, a Health Directive, POLST, or a discussion with a medical provider or your loved ones about your wishes): No, advance care planning information given to patient to review.  Patient plans to discuss their wishes with loved ones or  provider.      Social History     Tobacco Use     Smoking status: Never Smoker     Smokeless tobacco: Never Used   Substance Use Topics     Alcohol use: No     If you drink alcohol do you typically have >3 drinks per day or >7 drinks per week? No    Alcohol Use 3/25/2022   Prescreen: >3 drinks/day or >7 drinks/week? Not Applicable       Reviewed orders with patient.  Reviewed health maintenance and updated orders accordingly - Yes  Lab work is in process  Labs reviewed in EPIC    Breast Cancer Screening:    Breast CA Risk Assessment (FHS-7) 3/25/2022   Do you have a family history of breast, colon, or ovarian cancer? No / Unknown       click delete button to remove this line now  Patient under 40 years of age: Routine Mammogram Screening not recommended.   Pertinent mammograms are reviewed under the imaging tab.    History of abnormal Pap smear: NO - age 30-65 PAP every 5 years with negative HPV co-testing recommended  PAP / HPV 1/6/2017   PAP Negative for squamous intraepithelial lesion or malignancy  Electronically signed by Gary Caruso CT (ASCP) on 1/12/2017 at 12:24 PM       Reviewed and updated as needed this visit by clinical staff   Tobacco  Allergies  Meds      Soc Hx        Reviewed and updated as needed this visit by Provider                 History reviewed. No pertinent past medical history.   Past Surgical History:   Procedure Laterality Date     C/SECTION, LOW TRANSVERSE  2010       Review of Systems  CONSTITUTIONAL: NEGATIVE for fever, chills, change in weight  INTEGUMENTARU/SKIN: NEGATIVE for worrisome rashes, moles or lesions  EYES: NEGATIVE for vision changes or irritation  ENT: NEGATIVE for ear, mouth and throat problems  RESP: NEGATIVE for significant cough or SOB  BREAST: NEGATIVE for masses, tenderness or discharge  CV: NEGATIVE for chest pain, palpitations or peripheral edema  GI: NEGATIVE for nausea, abdominal pain, heartburn, or change in bowel habits  : NEGATIVE for unusual  "urinary or vaginal symptoms. Periods are regular.  MUSCULOSKELETAL: NEGATIVE for significant arthralgias or myalgia  NEURO: NEGATIVE for weakness, dizziness or paresthesias  PSYCHIATRIC: NEGATIVE for changes in mood or affect     OBJECTIVE:   /62 (BP Location: Right arm, Patient Position: Sitting, Cuff Size: Adult Regular)   Pulse 74   Temp 98  F (36.7  C) (Oral)   Resp 12   Ht 1.545 m (5' 0.83\")   Wt 83 kg (183 lb)   LMP 03/13/2022 (Exact Date)   SpO2 99%   Breastfeeding No   BMI 34.77 kg/m    Physical Exam  GENERAL: healthy, alert and no distress  EYES: Eyes grossly normal to inspection, PERRL and conjunctivae and sclerae normal  HENT: ear canals and TM's normal, nose and mouth without ulcers or lesions  NECK: no adenopathy, no asymmetry, masses, or scars and thyroid normal to palpation  RESP: lungs clear to auscultation - no rales, rhonchi or wheezes  BREAST: normal without masses, tenderness or nipple discharge and no palpable axillary masses or adenopathy  CV: regular rate and rhythm, normal S1 S2, no S3 or S4, no murmur, click or rub, no peripheral edema and peripheral pulses strong  ABDOMEN: soft, nontender, no hepatosplenomegaly, no masses and bowel sounds normal  MS: no gross musculoskeletal defects noted, no edema  SKIN: no suspicious lesions or rashes  NEURO: Normal strength and tone, mentation intact and speech normal  PSYCH: mentation appears normal, affect normal/bright    Diagnostic Test Results:  Labs reviewed in Epic    ASSESSMENT/PLAN:   (Z00.00) Routine general medical examination at a health care facility  (primary encounter diagnosis)  Comment: pt decline pap smear today and agrees to do it next year.   Plan: Lipid panel, Comprehensive metabolic panel (BMP        + Alb, Alk Phos, ALT, AST, Total. Bili, TP),         TSH with free T4 reflex, CBC with platelets,         Vitamin D Deficiency            (R51.9,  G89.29) Chronic nonintractable headache, unspecified headache " "type  Comment: headache on back of head to neck for 2 years. Started at afternoon and evening. Mild to moderate, sometimes feels nausea. Motrin helps. No head injury, vision change. She has poor sleep and feels alone at home. Likely headache due to adequate sleep   Plan: advise to improve sleep. Regular exercise. Encourage to get social contact with friends, family. Motrin prn. Follow-up in one month. If headache worse will consider mri.     (F51.01) Primary insomnia  Comment: poor sleep hygiene. She wakes up at middle and watch cell phone. Then has hard time to fall asleep.   Plan: good sleep hygiene addressed. Advise regular exercise.     Patient has been advised of split billing requirements and indicates understanding: Yes    COUNSELING:  Reviewed preventive health counseling, as reflected in patient instructions       Regular exercise       Healthy diet/nutrition       Vision screening       Hearing screening       Contraception       Osteoporosis prevention/bone health       Consider Hep C screening for all patients one time for ages 18-79 years       Advance Care Planning    Estimated body mass index is 34.77 kg/m  as calculated from the following:    Height as of this encounter: 1.545 m (5' 0.83\").    Weight as of this encounter: 83 kg (183 lb).    Weight management plan: Discussed healthy diet and exercise guidelines    She reports that she has never smoked. She has never used smokeless tobacco.      Counseling Resources:  ATP IV Guidelines  Pooled Cohorts Equation Calculator  Breast Cancer Risk Calculator  BRCA-Related Cancer Risk Assessment: FHS-7 Tool  FRAX Risk Assessment  ICSI Preventive Guidelines  Dietary Guidelines for Americans, 2010  USDA's MyPlate  ASA Prophylaxis  Lung CA Screening    Candice Puentes MD  [facility]  "

## 2022-10-01 ENCOUNTER — HEALTH MAINTENANCE LETTER (OUTPATIENT)
Age: 37
End: 2022-10-01

## 2023-05-14 ENCOUNTER — HEALTH MAINTENANCE LETTER (OUTPATIENT)
Age: 38
End: 2023-05-14

## 2023-12-12 ENCOUNTER — OFFICE VISIT (OUTPATIENT)
Dept: INTERNAL MEDICINE | Facility: CLINIC | Age: 38
End: 2023-12-12
Payer: COMMERCIAL

## 2023-12-12 VITALS
WEIGHT: 189.5 LBS | RESPIRATION RATE: 16 BRPM | DIASTOLIC BLOOD PRESSURE: 79 MMHG | SYSTOLIC BLOOD PRESSURE: 120 MMHG | BODY MASS INDEX: 35.78 KG/M2 | HEART RATE: 78 BPM | HEIGHT: 61 IN | OXYGEN SATURATION: 99 %

## 2023-12-12 DIAGNOSIS — L30.4 INTERTRIGO: ICD-10-CM

## 2023-12-12 DIAGNOSIS — K42.9 UMBILICAL HERNIA WITHOUT OBSTRUCTION AND WITHOUT GANGRENE: Primary | ICD-10-CM

## 2023-12-12 PROCEDURE — 99203 OFFICE O/P NEW LOW 30 MIN: CPT | Performed by: INTERNAL MEDICINE

## 2023-12-12 RX ORDER — LANOLIN ALCOHOL/MO/W.PET/CERES
1000 CREAM (GRAM) TOPICAL DAILY
COMMUNITY

## 2023-12-12 RX ORDER — FEXOFENADINE HCL 60 MG/1
TABLET, FILM COATED ORAL
COMMUNITY

## 2023-12-12 RX ORDER — ACETAMINOPHEN 325 MG/1
325-650 TABLET ORAL EVERY 6 HOURS PRN
COMMUNITY

## 2023-12-12 RX ORDER — NYSTATIN 100000 U/G
CREAM TOPICAL DAILY
Qty: 30 G | Refills: 3 | Status: SHIPPED | OUTPATIENT
Start: 2023-12-12

## 2023-12-12 RX ORDER — CLOTRIMAZOLE AND BETAMETHASONE DIPROPIONATE 10; .64 MG/G; MG/G
CREAM TOPICAL 2 TIMES DAILY
Qty: 45 G | Refills: 3 | Status: SHIPPED | OUTPATIENT
Start: 2023-12-12

## 2023-12-12 ASSESSMENT — PATIENT HEALTH QUESTIONNAIRE - PHQ9
SUM OF ALL RESPONSES TO PHQ QUESTIONS 1-9: 14
SUM OF ALL RESPONSES TO PHQ QUESTIONS 1-9: 14
10. IF YOU CHECKED OFF ANY PROBLEMS, HOW DIFFICULT HAVE THESE PROBLEMS MADE IT FOR YOU TO DO YOUR WORK, TAKE CARE OF THINGS AT HOME, OR GET ALONG WITH OTHER PEOPLE: SOMEWHAT DIFFICULT

## 2023-12-12 NOTE — PROGRESS NOTES
"  Assessment & Plan     Umbilical hernia without obstruction and without gangrene  Patient describes small palpable lump around the umbilicus, that she felt fort he fist time 2 months ago. If she pushes on it, it disappear. She feels some discomfort when she is doing that.  On the exam, no palpable abdominal masses or tenderness, except small < 1cm, umbilical hernia.  We discussed close monitoring vs surgery. She would like to meet with Gen Surgery.  - Adult General Surg Referral; Future    Intertrigo  I refilled her previous prescriptions. She will meet with her new PCP in one week to discuss skin itching.   - clotrimazole-betamethasone (LOTRISONE) 1-0.05 % external cream; Apply topically 2 times daily  - nystatin (MYCOSTATIN) 924813 UNIT/GM external cream; Apply topically daily             BMI:   Estimated body mass index is 36.01 kg/m  as calculated from the following:    Height as of this encounter: 1.545 m (5' 0.83\").    Weight as of this encounter: 86 kg (189 lb 8 oz).       Depression Screening Follow Up        2023     7:54 AM   PHQ   PHQ-9 Total Score 14   Q9: Thoughts of better off dead/self-harm past 2 weeks More than half the days   F/U: Thoughts of suicide or self-harm No   F/U: Safety concerns No               Discussed the following ways the patient can remain in a safe environment:        Connie Mattson MD  [facility]    Pamela Borges is a 38 year old, presenting for the following health issues:  Mass (Pos. Cyst By Belly Button Has popped when she pressed on it Mild Pain Before popping ) and Derm Problem (Itching  Area )      2023     7:56 AM   Additional Questions   Roomed by Key Chirinos    History of Present Illness       Reason for visit:  Found small bump near belly button  Symptom onset:  More than a month  Symptom intensity:  Mild  Symptom progression:  Staying the same  Had these symptoms before:  No  What makes it worse:  " "Headache & itching    She eats 0-1 servings of fruits and vegetables daily.She consumes 3 sweetened beverage(s) daily.She exercises with enough effort to increase her heart rate 9 or less minutes per day.  She exercises with enough effort to increase her heart rate 3 or less days per week.   She is taking medications regularly.                 Review of Systems         Objective    /79   Pulse 78   Resp 16   Ht 1.545 m (5' 0.83\")   Wt 86 kg (189 lb 8 oz)   LMP  (LMP Unknown)   SpO2 99%   BMI 36.01 kg/m    Body mass index is 36.01 kg/m .  Physical Exam                         "

## 2023-12-20 ENCOUNTER — OFFICE VISIT (OUTPATIENT)
Dept: SURGERY | Facility: CLINIC | Age: 38
End: 2023-12-20
Attending: INTERNAL MEDICINE
Payer: COMMERCIAL

## 2023-12-20 ENCOUNTER — OFFICE VISIT (OUTPATIENT)
Dept: FAMILY MEDICINE | Facility: CLINIC | Age: 38
End: 2023-12-20
Payer: COMMERCIAL

## 2023-12-20 VITALS
BODY MASS INDEX: 37.63 KG/M2 | HEART RATE: 88 BPM | TEMPERATURE: 97.7 F | HEIGHT: 60 IN | RESPIRATION RATE: 16 BRPM | WEIGHT: 191.7 LBS | OXYGEN SATURATION: 99 % | SYSTOLIC BLOOD PRESSURE: 104 MMHG | DIASTOLIC BLOOD PRESSURE: 62 MMHG

## 2023-12-20 VITALS
BODY MASS INDEX: 37.5 KG/M2 | WEIGHT: 191 LBS | HEIGHT: 60 IN | DIASTOLIC BLOOD PRESSURE: 62 MMHG | SYSTOLIC BLOOD PRESSURE: 104 MMHG

## 2023-12-20 DIAGNOSIS — R51.9 OCCIPITAL PAIN: ICD-10-CM

## 2023-12-20 DIAGNOSIS — R68.82 LOW LIBIDO: ICD-10-CM

## 2023-12-20 DIAGNOSIS — M54.2 CERVICAL PAIN (NECK): ICD-10-CM

## 2023-12-20 DIAGNOSIS — L50.9 URTICARIA: ICD-10-CM

## 2023-12-20 DIAGNOSIS — G43.719 INTRACTABLE CHRONIC MIGRAINE WITHOUT AURA AND WITHOUT STATUS MIGRAINOSUS: Primary | ICD-10-CM

## 2023-12-20 DIAGNOSIS — K42.9 UMBILICAL HERNIA WITHOUT OBSTRUCTION AND WITHOUT GANGRENE: ICD-10-CM

## 2023-12-20 PROCEDURE — 99214 OFFICE O/P EST MOD 30 MIN: CPT | Performed by: NURSE PRACTITIONER

## 2023-12-20 PROCEDURE — 99203 OFFICE O/P NEW LOW 30 MIN: CPT | Performed by: SURGERY

## 2023-12-20 RX ORDER — CYCLOBENZAPRINE HCL 5 MG
5 TABLET ORAL 3 TIMES DAILY PRN
Qty: 60 TABLET | Refills: 0 | Status: SHIPPED | OUTPATIENT
Start: 2023-12-20

## 2023-12-20 NOTE — PROGRESS NOTES
HPI:  Katerina Moran is a 38 year old female who was referred to me by Harleen Stanley for evaluation of tenderness at her umbilicus.  For the last several months she has had tenderness at her umbilicus.  She has noticed a bulge intermittently.  She can usually reduce this on her own.  Her umbilicus remains tender most of the time.  She is generally healthy.  Her only surgical history of  section.    Allergies:Patient has no known allergies.    PMH denies    Past Surgical History:   Procedure Laterality Date    C/SECTION, LOW TRANSVERSE         Current Outpatient Medications   Medication Sig Dispense Refill    acetaminophen (TYLENOL) 325 MG tablet Take 325-650 mg by mouth every 6 hours as needed for mild pain      clotrimazole-betamethasone (LOTRISONE) 1-0.05 % external cream Apply topically 2 times daily 45 g 3    cyanocobalamin (VITAMIN B-12) 1000 MCG tablet Take 1,000 mcg by mouth daily      cyclobenzaprine (FLEXERIL) 5 MG tablet Take 1 tablet (5 mg) by mouth 3 times daily as needed for muscle spasms 60 tablet 0    fexofenadine (ALLEGRA ALLERGY) 60 MG tablet Allegra      nystatin (MYCOSTATIN) 824038 UNIT/GM external cream Apply topically daily 30 g 3       Family History   Problem Relation Age of Onset    Hyperlipidemia Mother     Diabetes Father     Hypertension Father     Diabetes Paternal Grandmother     Diabetes Paternal Grandfather         reports that she has never smoked. She has never been exposed to tobacco smoke. She has never used smokeless tobacco. She reports that she does not drink alcohol and does not use drugs.      /62   Ht 1.524 m (5')   Wt 86.6 kg (191 lb)   LMP 2023 (Approximate)   BMI 37.30 kg/m    Body mass index is 37.3 kg/m .    EXAM:  GENERAL: Well developed female in NAD  CV: RRR  PULM: Non-labored breathing on RA  ABDOMEN: Soft and nondistended.  Tender in her umbilical stalk.  Exam is a little challenging due to body habitus but I can feel a tender nodule  and likely defect that is approximately 1 cm or smaller.  Patient confirms that this is the area where she sees the bulging when it happens.  NEURO: No obvious deficits noted.  EXT: No edema, no obvious deformities or any other abnormalities    Assessment/Plan:    Katerina Moran is a 38 year old female presenting with a small umbilical hernia.  We discussed the pathophysiology of this disease.  We discussed treatment strategies including watch and wait versus surgery.  For now the patient would like to see how her symptoms progress and likely have this fixed sometime in the coming year.  We discussed the details of open umbilical herniorrhaphy.  This included discussion of the possible use of mesh depending on the size of the defect.  We discussed the risks of bleeding, infection, and injury to surrounding structures.  The patient consents and will call us when she is ready to proceed with surgery.  In the meantime I counseled her to reduce the hernia if it becomes painful and tense and she can use ibuprofen for pain.  I will perform her H&P on the day of surgery.      Jacob Lam MD  General Surgeon  [facility]  Surgery [address] [address]  Office: [phone]

## 2023-12-20 NOTE — PROGRESS NOTES
Assessment & Plan     Intractable chronic migraine without aura and without status migrainosus\  Patient migraine triggers is likely due to posterior neck pain and shoulder tightness.  Therefore we will do a trial of muscle relaxant.  1-2 tabs of cyclobenzaprine as needed.  Given chronic history and previous suggestion by previous provider of the brain MRI.  Will place this referral today.  Patient does have a history of claustrophobia therefore patient instructed to notify provider when this is scheduled and I will give a short course of as needed benzodiazepines for anxiety around small spaces  Follow up for trigger point injection if no improvement with the flexeril - max ibuprofen 4 days week.   - cyclobenzaprine (FLEXERIL) 5 MG tablet  Dispense: 60 tablet; Refill: 0  - MR Brain w/o Contrast    Urticaria  Patient with chronic hives that improved with use of high-dose antihistamines.  Discussed likely diagnosis of Chronic urticaria despite not having history of known viral illness or thyroid disease.  Discussed symptom wise this is not likely due to a repeat exposure to an allergen however patient may start to notice other triggers that could possibly cause or be an allergy.  Discussed briefly with patient today  -Plan to continue Zyrtec or Claritin 2 tablets daily to maintain LoHist to right main levels.  Cautioned against overdosing on antihistamines.  Discussed max dose of 4 tablets however should be well-controlled with 2 tablets of taking daily staying ahead of histamine levels  -Discussed cold water at the end of the shower, cool compresses as needed for pruritic events    All questions answered and patient verbalized understanding agrees with plan of care    Occipital pain  See migraine headaches above  Patient with posterior occipital pain likely related to neck tightness and cervical spine curvature deformity  Patient starting to have deformity in the curvature of the cervical spine at the base of the  neck related to dowagers hump.  On physical exam does have head and neck forward position.  Referral placed for physical therapy, and discussed chin tuck and neck stretches as well as deep massage  1-2 tabs of flexaril as needed for posterior neck pain or shoulder tightness  Physical therapy to correct dowager hum and head forward position.   - cyclobenzaprine (FLEXERIL) 5 MG tablet  Dispense: 60 tablet; Refill: 0  - Physical Therapy Referral    Cervical pain (neck)  CS typical pain above  - cyclobenzaprine (FLEXERIL) 5 MG tablet  Dispense: 60 tablet; Refill: 0  - Physical Therapy Referral      Low libido -briefly discussed questions patient has today about low libido.  Suggested self-help resources.  Briefly discussed duality sex drive and had to have productive conversations with partner today.  Will follow-up as needed.    Discussed with patient that she may follow-up with me to discuss weight management plan    Spent 35mins doing chart review, history and exam, patient education, documentation and further activities per the note.               BMI:   Estimated body mass index is 37.44 kg/m  as calculated from the following:    Height as of this encounter: 1.524 m (5').    Weight as of this encounter: 87 kg (191 lb 11.2 oz).   Weight management plan: Discussed healthy diet and exercise guidelines        ROSA Barclay CNP  [facility]    Pamela Borges is a 38 year old, presenting for the following health issues:  Derm Problem (Itching on  area mostly in the evening and severe headache.)        2023    11:15 AM   Additional Questions   Roomed by LC-LPN   Accompanied by N/A       HPI   #Sxs for 1 year- Patient with itching started in the groins and C section area and then the itching will go to the ears and hands and legs - takes allergy medication 2 tabs in morning and two tabs in the evening. She is taking 15 days and stop and will take again another 15  days - itching for 1 year. Getting urinaric hives.  Patient with hx of vulvar yeast infection and used creams.    She didn't COVID at any point that she noted.     #Headache: headaches for 4 years - occuring 2-3 days/week - responded well to ibuprofen and no HA the next day.  -take ibuprofen takes 2 tablets a day  - headache in the back of the head and her neck starts to hurt and her headache worsens starting last month - has been possibly under more stress - working and in class. she has Nausea and vomiting feeling - Photo and phonophobia  has had for a long time   -denies vision changes and weakness or numbness in body   Glasses updated 1 year ago.       Review of Systems   Constitutional, HEENT, cardiovascular, pulmonary, gi and gu systems are negative, except as otherwise noted.      Objective    /62 (BP Location: Right arm, Patient Position: Sitting, Cuff Size: Adult Regular)   Pulse 88   Temp 97.7  F (36.5  C) (Oral)   Resp 16   Ht 1.524 m (5')   Wt 87 kg (191 lb 11.2 oz)   LMP 12/13/2023 (Approximate)   SpO2 99%   BMI 37.44 kg/m    Body mass index is 37.44 kg/m .  Physical Exam   GENERAL: healthy, alert and no distress  NECK: Ears and head forward.  Anterior to shoulder, dowagers hump seen, pain with palpation of inferior occipital bone worse on the left than the right, muscle tension felt in trap bilateral trapezius with myofascial knots , no adenopathy, no asymmetry, masses, or scars and thyroid normal to palpation  RESP: lungs clear to auscultation - no rales, rhonchi or wheezes  CV: regular rate and rhythm, normal S1 S2, no S3 or S4, no murmur, click or rub, no peripheral edema and peripheral pulses strong  ABDOMEN: soft, nontender, no hepatosplenomegaly, no masses and bowel sounds normal  MS: no gross musculoskeletal defects noted, no edema

## 2023-12-20 NOTE — LETTER
2023         RE: Katerina Moran  [address]  [facility] [address]        Dear Colleague,    Thank you for referring your patient, Katerina Moran, to the [facility] [place]. Please see a copy of my visit note below.    HPI:  Katerina Moran is a 38 year old female who was referred to me by Harleen Stanley for evaluation of tenderness at her umbilicus.  For the last several months she has had tenderness at her umbilicus.  She has noticed a bulge intermittently.  She can usually reduce this on her own.  Her umbilicus remains tender most of the time.  She is generally healthy.  Her only surgical history of  section.    Allergies:Patient has no known allergies.    PMH denies    Past Surgical History:   Procedure Laterality Date     C/SECTION, LOW TRANSVERSE         Current Outpatient Medications   Medication Sig Dispense Refill     acetaminophen (TYLENOL) 325 MG tablet Take 325-650 mg by mouth every 6 hours as needed for mild pain       clotrimazole-betamethasone (LOTRISONE) 1-0.05 % external cream Apply topically 2 times daily 45 g 3     cyanocobalamin (VITAMIN B-12) 1000 MCG tablet Take 1,000 mcg by mouth daily       cyclobenzaprine (FLEXERIL) 5 MG tablet Take 1 tablet (5 mg) by mouth 3 times daily as needed for muscle spasms 60 tablet 0     fexofenadine (ALLEGRA ALLERGY) 60 MG tablet Allegra       nystatin (MYCOSTATIN) 166863 UNIT/GM external cream Apply topically daily 30 g 3       Family History   Problem Relation Age of Onset     Hyperlipidemia Mother      Diabetes Father      Hypertension Father      Diabetes Paternal Grandmother      Diabetes Paternal Grandfather         reports that she has never smoked. She has never been exposed to tobacco smoke. She has never used smokeless tobacco. She reports that she does not drink alcohol and does not use drugs.      /62   Ht 1.524 m (5')   Wt 86.6 kg (191 lb)   LMP 2023  (Approximate)   BMI 37.30 kg/m    Body mass index is 37.3 kg/m .    EXAM:  GENERAL: Well developed female in NAD  CV: RRR  PULM: Non-labored breathing on RA  ABDOMEN: Soft and nondistended.  Tender in her umbilical stalk.  Exam is a little challenging due to body habitus but I can feel a tender nodule and likely defect that is approximately 1 cm or smaller.  Patient confirms that this is the area where she sees the bulging when it happens.  NEURO: No obvious deficits noted.  EXT: No edema, no obvious deformities or any other abnormalities    Assessment/Plan:    Katerina Moran is a 38 year old female presenting with a small umbilical hernia.  We discussed the pathophysiology of this disease.  We discussed treatment strategies including watch and wait versus surgery.  For now the patient would like to see how her symptoms progress and likely have this fixed sometime in the coming year.  We discussed the details of open umbilical herniorrhaphy.  This included discussion of the possible use of mesh depending on the size of the defect.  We discussed the risks of bleeding, infection, and injury to surrounding structures.  The patient consents and will call us when she is ready to proceed with surgery.  In the meantime I counseled her to reduce the hernia if it becomes painful and tense and she can use ibuprofen for pain.  I will perform her H&P on the day of surgery.      Jacob Lam MD  General Surgeon  [facility]  Surgery [address]  [place] [address]  Office: [phone]      Again, thank you for allowing me to participate in the care of your patient.        Sincerely,        Jacob Lam MD

## 2024-05-06 ENCOUNTER — OFFICE VISIT (OUTPATIENT)
Dept: FAMILY MEDICINE | Facility: CLINIC | Age: 39
End: 2024-05-06
Payer: COMMERCIAL

## 2024-05-06 VITALS
SYSTOLIC BLOOD PRESSURE: 108 MMHG | OXYGEN SATURATION: 98 % | BODY MASS INDEX: 36.72 KG/M2 | HEART RATE: 110 BPM | DIASTOLIC BLOOD PRESSURE: 64 MMHG | WEIGHT: 188 LBS

## 2024-05-06 DIAGNOSIS — J02.9 SORE THROAT: Primary | ICD-10-CM

## 2024-05-06 LAB — DEPRECATED S PYO AG THROAT QL EIA: POSITIVE

## 2024-05-06 PROCEDURE — 87880 STREP A ASSAY W/OPTIC: CPT | Performed by: FAMILY MEDICINE

## 2024-05-06 PROCEDURE — 99213 OFFICE O/P EST LOW 20 MIN: CPT | Performed by: FAMILY MEDICINE

## 2024-05-06 RX ORDER — PENICILLIN V POTASSIUM 500 MG/1
500 TABLET, FILM COATED ORAL 2 TIMES DAILY
Qty: 20 TABLET | Refills: 0 | Status: SHIPPED | OUTPATIENT
Start: 2024-05-06 | End: 2024-05-16

## 2024-05-06 ASSESSMENT — ENCOUNTER SYMPTOMS: FEVER: 1

## 2024-05-06 ASSESSMENT — PATIENT HEALTH QUESTIONNAIRE - PHQ9
SUM OF ALL RESPONSES TO PHQ QUESTIONS 1-9: 8
10. IF YOU CHECKED OFF ANY PROBLEMS, HOW DIFFICULT HAVE THESE PROBLEMS MADE IT FOR YOU TO DO YOUR WORK, TAKE CARE OF THINGS AT HOME, OR GET ALONG WITH OTHER PEOPLE: NOT DIFFICULT AT ALL
SUM OF ALL RESPONSES TO PHQ QUESTIONS 1-9: 8

## 2024-05-06 NOTE — LETTER
May 6, 2024      Katerina Moran  [address] [facility] [address]        Dear ,    We are writing to inform you of your test results.  As discussed the rapid strep test is positive for strep throat, penicillin should be effective against this.        Resulted Orders   Streptococcus A Rapid Screen w/Reflex to PCR - Clinic Collect   Result Value Ref Range    Group A Strep antigen Positive (A) Negative       If you have any questions or concerns, please call the clinic at the number listed above.       Sincerely,      Quincy Aleman MD

## 2024-05-06 NOTE — PROGRESS NOTES
Assessment & Plan     (J02.9) Sore throat  (primary encounter diagnosis)  Comment: Acute sore throat, secondary to Streptococcus  Plan: Streptococcus A Rapid Screen w/Reflex to PCR -         Clinic Collect, penicillin V (VEETID) 500 MG         tablet             PLAN:  1.  Penicillin 500 mg twice daily x 10 days  2.  Patient also to continue symptomatic treatment and follow-up as needed                  Subjective   Katerina is a 38 year old, presenting for the following health issues:  Fever (101- 102 F ) and sore throat      5/6/2024    11:34 AM   Additional Questions   Roomed by Gisella   Accompanied by self     Fever  Associated symptoms include a fever.   History of Present Illness       Reason for visit:  Fever,Sore throat, right hand &legs pain  Symptom onset:  1-3 days ago  Symptom intensity:  Severe  Symptom progression:  Improving  Had these symptoms before:  No  What makes it worse:  Sore throat and right hand pain  What makes it better:  NA    She eats 0-1 servings of fruits and vegetables daily.She consumes 2 sweetened beverage(s) daily.She exercises with enough effort to increase her heart rate 9 or less minutes per day.  She exercises with enough effort to increase her heart rate 3 or less days per week.   She is taking medications regularly.     Patient comes in with her , about 2 days ago so she developed a sore throat also low-grade temperature though she is normotensive today but she has taken some ibuprofen.  Patient reports that she can eat and drink though it is difficult to swallow.  No ear pain she is not coughing she does have some overall body aches.    I discussed with the patient that the rapid strep test is positive of note she does work in customer service, and believes she may have been exposed there nobody else in the family is sick.    I discussed treatment options the patient prefers oral rather than IM medication.  I also told her she certainly can continue ibuprofen she is  not working today I told her she is infectious for the next 24 hours or so.                    Objective    There were no vitals taken for this visit.  There is no height or weight on file to calculate BMI.  Physical Exam               Signed Electronically by: Quincy Aleman MD

## 2025-06-17 ENCOUNTER — PATIENT OUTREACH (OUTPATIENT)
Dept: CARE COORDINATION | Facility: CLINIC | Age: 40
End: 2025-06-17
Payer: COMMERCIAL

## 2025-08-31 ENCOUNTER — HEALTH MAINTENANCE LETTER (OUTPATIENT)
Age: 40
End: 2025-08-31